# Patient Record
Sex: MALE | Race: OTHER | Employment: FULL TIME | URBAN - METROPOLITAN AREA
[De-identification: names, ages, dates, MRNs, and addresses within clinical notes are randomized per-mention and may not be internally consistent; named-entity substitution may affect disease eponyms.]

---

## 2017-06-02 ENCOUNTER — TELEPHONE (OUTPATIENT)
Dept: FAMILY MEDICINE CLINIC | Facility: CLINIC | Age: 27
End: 2017-06-02

## 2017-06-02 RX ORDER — METHYLPREDNISOLONE 4 MG/1
TABLET ORAL
Qty: 1 KIT | Refills: 0 | Status: SHIPPED | OUTPATIENT
Start: 2017-06-02 | End: 2017-09-25

## 2017-06-02 NOTE — TELEPHONE ENCOUNTER
I spoke to patient regarding his symptoms and he said that is not swollen is just watery and irritated. Per Dr Gonzáles Shoulders have the patient start a Medro Dose Marlon and use OTC Xyzal or Zyrtec. Rx sent to his [facility].

## 2017-09-25 ENCOUNTER — OFFICE VISIT (OUTPATIENT)
Dept: FAMILY MEDICINE CLINIC | Facility: CLINIC | Age: 27
End: 2017-09-25

## 2017-09-25 VITALS
SYSTOLIC BLOOD PRESSURE: 112 MMHG | BODY MASS INDEX: 28.17 KG/M2 | DIASTOLIC BLOOD PRESSURE: 70 MMHG | WEIGHT: 188 LBS | HEART RATE: 66 BPM | OXYGEN SATURATION: 100 % | HEIGHT: 68.5 IN

## 2017-09-25 DIAGNOSIS — S83.61XA: Primary | ICD-10-CM

## 2017-09-25 PROCEDURE — 99212 OFFICE O/P EST SF 10 MIN: CPT | Performed by: FAMILY MEDICINE

## 2017-09-25 RX ORDER — METHYLPREDNISOLONE 4 MG/1
TABLET ORAL
Qty: 1 KIT | Refills: 1 | Status: SHIPPED | OUTPATIENT
Start: 2017-09-25 | End: 2020-01-20

## 2017-09-25 NOTE — PROGRESS NOTES
[facility] Office Note  Chief Complaint:   Patient presents with:  Knee Pain      HPI:   This is a 32year old male coming in for    No results found for this or any previous visit.     Past Medical History:   Diagnosis and rhythm, no murmurs, rubs or gallops. LUNGS: Clear to auscultation bilterally, no rales/rhonchi/wheezing. CHEST: No tenderness. ABDOMEN:  Soft, nondistended, nontender, bowel sounds normal in all 4 quadrants, no masses, no hepatosplenomegaly.   BACK:

## 2018-04-26 ENCOUNTER — HOSPITAL ENCOUNTER (EMERGENCY)
Facility: HOSPITAL | Age: 28
Discharge: HOME | End: 2018-04-27
Attending: EMERGENCY MEDICINE
Payer: COMMERCIAL

## 2018-04-26 DIAGNOSIS — K21.9 GASTROESOPHAGEAL REFLUX DISEASE, ESOPHAGITIS PRESENCE NOT SPECIFIED: Primary | ICD-10-CM

## 2018-04-26 PROCEDURE — 96361 HYDRATE IV INFUSION ADD-ON: CPT

## 2018-04-26 PROCEDURE — 96374 THER/PROPH/DIAG INJ IV PUSH: CPT

## 2018-04-26 PROCEDURE — 99284 EMERGENCY DEPT VISIT MOD MDM: CPT

## 2018-04-26 PROCEDURE — 80048 BASIC METABOLIC PNL TOTAL CA: CPT | Performed by: EMERGENCY MEDICINE

## 2018-04-26 PROCEDURE — 85025 COMPLETE CBC W/AUTO DIFF WBC: CPT | Performed by: EMERGENCY MEDICINE

## 2018-04-26 PROCEDURE — 83690 ASSAY OF LIPASE: CPT | Performed by: EMERGENCY MEDICINE

## 2018-04-26 PROCEDURE — 80076 HEPATIC FUNCTION PANEL: CPT | Performed by: EMERGENCY MEDICINE

## 2018-04-26 RX ORDER — MAGNESIUM HYDROXIDE/ALUMINUM HYDROXICE/SIMETHICONE 120; 1200; 1200 MG/30ML; MG/30ML; MG/30ML
30 SUSPENSION ORAL ONCE
Status: COMPLETED | OUTPATIENT
Start: 2018-04-26 | End: 2018-04-26

## 2018-04-26 RX ORDER — ONDANSETRON 2 MG/ML
4 INJECTION INTRAMUSCULAR; INTRAVENOUS ONCE
Status: COMPLETED | OUTPATIENT
Start: 2018-04-26 | End: 2018-04-26

## 2018-04-26 RX ORDER — DICYCLOMINE HYDROCHLORIDE 10 MG/5ML
20 SOLUTION ORAL ONCE
Status: COMPLETED | OUTPATIENT
Start: 2018-04-26 | End: 2018-04-26

## 2018-04-27 VITALS
RESPIRATION RATE: 17 BRPM | OXYGEN SATURATION: 99 % | DIASTOLIC BLOOD PRESSURE: 66 MMHG | WEIGHT: 170 LBS | TEMPERATURE: 98 F | HEIGHT: 67 IN | SYSTOLIC BLOOD PRESSURE: 105 MMHG | HEART RATE: 70 BPM | BODY MASS INDEX: 26.68 KG/M2

## 2018-04-27 RX ORDER — PANTOPRAZOLE SODIUM 40 MG/1
40 TABLET, DELAYED RELEASE ORAL DAILY
Qty: 14 TABLET | Refills: 0 | Status: SHIPPED | OUTPATIENT
Start: 2018-04-27 | End: 2018-05-11

## 2018-04-27 NOTE — ED PROVIDER NOTES
Patient Seen in: [facility] AND [facility] Emergency Department    History   Patient presents with:  Abdomen/Flank Pain (GI/)    Stated Complaint: Abdominal pain, nausea/vomiting    HPI  25-year-old male presents emergency department with heartburn.   He has be 26.63 kg/m²         Physical Exam   Constitutional: He is oriented to person, place, and time. He appears well-developed and well-nourished. No distress. HENT:   Head: Normocephalic and atraumatic.    Mouth/Throat: Oropharynx is clear and moist.   Eyes: C ------                     CBC W/ DIFFERENTIAL[[id number]]          Abnormal            Final result                 Please view results for these tests on the individual orders.    HEPATIC FUNCTION PANEL (7)   [facility]

## 2020-01-21 PROBLEM — R10.13 DYSPEPSIA: Status: ACTIVE | Noted: 2020-01-21

## 2020-04-06 ENCOUNTER — HOSPITAL ENCOUNTER (EMERGENCY)
Facility: HOSPITAL | Age: 30
Discharge: HOME | End: 2020-04-06
Attending: EMERGENCY MEDICINE
Payer: COMMERCIAL

## 2020-04-06 ENCOUNTER — APPOINTMENT (OUTPATIENT)
Dept: GENERAL RADIOLOGY | Facility: HOSPITAL | Age: 30
End: 2020-04-06
Attending: EMERGENCY MEDICINE
Payer: COMMERCIAL

## 2020-04-06 VITALS
RESPIRATION RATE: 18 BRPM | DIASTOLIC BLOOD PRESSURE: 75 MMHG | HEART RATE: 74 BPM | OXYGEN SATURATION: 98 % | HEIGHT: 69 IN | WEIGHT: 190 LBS | BODY MASS INDEX: 28.14 KG/M2 | TEMPERATURE: 99 F | SYSTOLIC BLOOD PRESSURE: 131 MMHG

## 2020-04-06 DIAGNOSIS — J98.8 VIRAL RESPIRATORY ILLNESS: Primary | ICD-10-CM

## 2020-04-06 DIAGNOSIS — B97.89 VIRAL RESPIRATORY ILLNESS: Primary | ICD-10-CM

## 2020-04-06 PROCEDURE — 99283 EMERGENCY DEPT VISIT LOW MDM: CPT

## 2020-04-06 PROCEDURE — 71045 X-RAY EXAM CHEST 1 VIEW: CPT | Performed by: EMERGENCY MEDICINE

## 2020-04-06 NOTE — ED PROVIDER NOTES
Patient Seen in: [facility] Emergency Department      History   Patient presents with:  Cough/URI    Stated Complaint: Cough     HPI    18-year-old male was healthy living at home with family still working who 3 days ago started having some mild s distress. Head: Normocephalic and atraumatic. Eyes: Conjunctivae are normal. Pupils are equal, round, and reactive to light. Neck: Normal range of motion. Neck supple. No lymphadenopathy. No stridor.   Cardiovascular: Normal rate, regular rhythm and respiratory illness  (primary encounter diagnosis)    Disposition:  Discharge  4/6/2020  1:33 pm    Follow-up:  Zoe Guy MD  [address] [phone]    Call  As needed        Medications Prescribed:  Current Disc

## 2020-04-06 NOTE — ED INITIAL ASSESSMENT (HPI)
Sore throat, cough, runny eyes, runny nose. No fevers. 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

## 2020-10-21 ENCOUNTER — APPOINTMENT (OUTPATIENT)
Dept: LAB | Facility: HOSPITAL | Age: 30
End: 2020-10-21
Attending: FAMILY MEDICINE
Payer: COMMERCIAL

## 2020-10-21 DIAGNOSIS — R05.9 COUGH: ICD-10-CM

## 2021-10-12 ENCOUNTER — HOSPITAL ENCOUNTER (EMERGENCY)
Facility: HOSPITAL | Age: 31
Discharge: HOME | End: 2021-10-12
Payer: COMMERCIAL

## 2021-10-12 ENCOUNTER — APPOINTMENT (OUTPATIENT)
Dept: GENERAL RADIOLOGY | Facility: HOSPITAL | Age: 31
End: 2021-10-12
Attending: NURSE PRACTITIONER
Payer: COMMERCIAL

## 2021-10-12 VITALS
HEART RATE: 93 BPM | HEIGHT: 66 IN | SYSTOLIC BLOOD PRESSURE: 127 MMHG | BODY MASS INDEX: 31.82 KG/M2 | DIASTOLIC BLOOD PRESSURE: 77 MMHG | RESPIRATION RATE: 18 BRPM | WEIGHT: 198 LBS | TEMPERATURE: 99 F | OXYGEN SATURATION: 95 %

## 2021-10-12 DIAGNOSIS — B34.2 CORONAVIRUS INFECTION: Primary | ICD-10-CM

## 2021-10-12 PROCEDURE — 71045 X-RAY EXAM CHEST 1 VIEW: CPT | Performed by: NURSE PRACTITIONER

## 2021-10-12 PROCEDURE — 99453 REM MNTR PHYSIOL PARAM SETUP: CPT

## 2021-10-12 PROCEDURE — 99284 EMERGENCY DEPT VISIT MOD MDM: CPT

## 2021-10-12 RX ORDER — ALBUTEROL SULFATE 90 UG/1
2 AEROSOL, METERED RESPIRATORY (INHALATION) EVERY 4 HOURS PRN
Qty: 1 EACH | Refills: 0 | Status: SHIPPED | OUTPATIENT
Start: 2021-10-12 | End: 2021-11-11

## 2021-10-12 RX ORDER — IBUPROFEN 600 MG/1
600 TABLET ORAL ONCE
Status: COMPLETED | OUTPATIENT
Start: 2021-10-12 | End: 2021-10-12

## 2021-10-12 NOTE — ED PROVIDER NOTES
Patient Seen in: [facility] AND [facility] Emergency Department    History   Patient presents with:  Covid    Stated Complaint: Covid+ sob    HPI    Patient here with cough, shortness of breath, body aches for 9 days.   No travel, unknown contact with known crono °F (39.4 °C)   Temp src Oral   SpO2 94 %   O2 Device None (Room air)       Current:/77   Pulse 93   Temp 99.2 °F (37.3 °C) (Oral)   Resp 18   Ht 167.6 cm (5' 6\")   Wt 89.8 kg   SpO2 95%   BMI 31.96 kg/m²   PULSE OX 95% on room air  GENERAL: Sickly a social distance, avoid sharing personal items, clean and disinfect “high touch” surfaces, and frequent handwashing) according to CDC guidelines.                  Disposition and Plan     Clinical Impression:  Coronavirus infection  (primary encounter diagno

## 2025-02-11 ENCOUNTER — OFFICE VISIT (OUTPATIENT)
Dept: OTOLARYNGOLOGY | Facility: CLINIC | Age: 35
End: 2025-02-11

## 2025-02-11 DIAGNOSIS — R09.82 POSTNASAL DISCHARGE: ICD-10-CM

## 2025-02-11 DIAGNOSIS — J35.1 TONSILLAR HYPERTROPHY: ICD-10-CM

## 2025-02-11 DIAGNOSIS — R10.13 DYSPEPSIA: Primary | ICD-10-CM

## 2025-02-11 PROCEDURE — 99203 OFFICE O/P NEW LOW 30 MIN: CPT | Performed by: OTOLARYNGOLOGY

## 2025-02-11 RX ORDER — PANTOPRAZOLE SODIUM 40 MG/1
40 TABLET, DELAYED RELEASE ORAL
Qty: 60 TABLET | Refills: 3 | Status: SHIPPED | OUTPATIENT
Start: 2025-02-11

## 2025-02-11 RX ORDER — MONTELUKAST SODIUM 10 MG/1
10 TABLET ORAL NIGHTLY
Qty: 30 TABLET | Refills: 3 | Status: SHIPPED | OUTPATIENT
Start: 2025-02-11

## 2025-02-11 RX ORDER — AZELASTINE 1 MG/ML
2 SPRAY, METERED NASAL 2 TIMES DAILY
Qty: 30 ML | Refills: 3 | Status: SHIPPED | OUTPATIENT
Start: 2025-02-11

## 2025-02-11 NOTE — PROGRESS NOTES
Braden Garzon is a 34 year old male.    Chief Complaint   Patient presents with    Tonsil Problem     Patient is here due to enlarged tonsils       HISTORY OF PRESENT ILLNESS  He presents with 2-year history of chronic throat issues.  Complains of dysphonia throat clearing cough and discomfort of the throat at times.  No acute streptococcal infections.  Treated with some type of medication 2 years ago with no real improvement in his symptoms seen by his primary care physician and referred to me by Dr. Madsen for his complaints of difficulty breathing and loss of voice.  Noted to have kissing tonsils at that time.  He is currently on omeprazole for reflux and states that despite taking that every morning he has significant reflux symptoms throughout the day and night especially if he eats certain types of food.  Denies obstructive sleep apnea states that he does not even snore.  Also denies any recurrent tonsillar infections in the past.      Social History     Socioeconomic History    Marital status:    Tobacco Use    Smoking status: Never    Smokeless tobacco: Never   Vaping Use    Vaping status: Never Used   Substance and Sexual Activity    Alcohol use: Yes     Alcohol/week: 0.0 standard drinks of alcohol    Drug use: No       History reviewed. No pertinent family history.    Past Medical History:    Appendicitis    Per [employer] \"Other appendicitis\"    Dermatitis    Epistaxis    Fatigue    Healthy adult on routine physical examination    Otalgia, unspecified ear    Resolved    Personal history of non-drug allergy    Per [employer] \"Pers hx-oth allerg not medicinl agt\"       Past Surgical History:   Procedure Laterality Date    Appendectomy      Circumcision,othr           REVIEW OF SYSTEMS    System Neg/Pos Details   Constitutional Negative Fatigue, fever and weight loss.   ENMT Negative Drooling.   Eyes Negative Blurred vision and vision changes.   Respiratory Negative Dyspnea and wheezing.    Cardio Negative Chest pain, irregular heartbeat/palpitations and syncope.   GI Negative Abdominal pain and diarrhea.   Endocrine Negative Cold intolerance and heat intolerance.   Neuro Negative Tremors.   Psych Negative Anxiety and depression.   Integumentary Negative Frequent skin infections, pigment change and rash.   Hema/Lymph Negative Easy bleeding and easy bruising.           PHYSICAL EXAM    There were no vitals taken for this visit.       Constitutional Normal Overall appearance - Normal.   Psychiatric Normal Orientation - Oriented to time, place, person & situation. Appropriate mood and affect.   Neck Exam Normal Inspection - Normal. Palpation - Normal. Parotid gland - Normal. Thyroid gland - Normal.   Eyes Normal Conjunctiva - Right: Normal, Left: Normal. Pupil - Right: Normal, Left: Normal. Fundus - Right: Normal, Left: Normal.   Neurological Normal Memory - Normal. Cranial nerves - Cranial nerves II through XII grossly intact.   Head/Face Normal Facial features - Normal. Eyebrows - Normal. Skull - Normal.        Nasopharynx Normal External nose - Normal. Lips/teeth/gums - Normal. Tonsils - Normal. Oropharynx - Normal.   Ears Normal Inspection - Right: Normal, Left: Normal. Canal - Right: Normal, Left: Normal. TM - Right: Normal, Left: Normal.   Skin Normal Inspection - Normal.        Lymph Detail Normal Submental. Submandibular. Anterior cervical. Posterior cervical. Supraclavicular.        Nose/Mouth/Throat Normal External nose - Normal. Lips/teeth/gums - Normal. Tonsils -3+ oropharynx -postnasal discharge with erythema   Nose/Mouth/Throat Normal Nares - Right: Normal Left: Normal. Septum -Normal  Turbinates - Right: Normal, Left: Normal.  Nasal mucosa congested       Current Outpatient Medications:     pantoprazole 40 MG Oral Tab EC, Take 1 tablet (40 mg total) by mouth 2 (two) times daily before meals., Disp: 60 tablet, Rfl: 3    montelukast 10 MG Oral Tab, Take 1 tablet (10 mg total) by mouth  nightly., Disp: 30 tablet, Rfl: 3    loratadine-pseudoephedrine ER 5-120 MG Oral Tablet 12 Hr, Take 1 tablet by mouth every 12 (twelve) hours., Disp: 60 tablet, Rfl: 3    azelastine 0.1 % Nasal Solution, 2 sprays by Nasal route 2 (two) times daily., Disp: 30 mL, Rfl: 3    Omeprazole 40 MG Oral Capsule Delayed Release, Take 1 capsule (40 mg total) by mouth daily., Disp: 90 capsule, Rfl: 3  ASSESSMENT AND PLAN    1. Dyspepsia    2. Postnasal discharge  - loratadine-pseudoephedrine ER 5-120 MG Oral Tablet 12 Hr; Take 1 tablet by mouth every 12 (twelve) hours.  Dispense: 60 tablet; Refill: 3    3. Tonsillar hypertrophy  He is to have breakthrough reflux symptoms despite being on omeprazole daily.  He has significant postnasal discharge and 3+ tonsils.  Specifically denies any history of recurrent streptococcal infection and also specifically denies any history of snoring or obstructive sleep apnea.  At this time I did ask him to changing from omeprazole to pantoprazole twice daily, Singulair Loratadine-D and Astelin for some nasal symptoms and postnasal discharge.  Return to see me in 1 month to see if he notes any improvement in his throat symptoms specifically dysphonia throat pain throat clearing and cough.  I do suspect that he may have some level obstructive sleep apnea but he currently denies therefore we will discuss further when he returns.  At this time no indication for tonsillectomy based on his lack of symptoms        This note was prepared using [employer] voice recognition dictation software. As a result errors may occur. When identified these errors have been corrected. While every attempt is made to correct errors during dictation discrepancies may still exist    Ciro Cody MD    2/11/2025    3:31 PM

## (undated) NOTE — LETTER
Brian Madsen Md  [address]  [place] [address]       02/11/25        Patient: Braden Garzon   YOB: 1990   Date of Visit: 2/11/2025       Dear  Dr. Tena MD,      Thank you for referring Braden Garzon to my practice.  Please find my assessment and plan below.    ASSESSMENT AND PLAN    1. Dyspepsia    2. Postnasal discharge  - loratadine-pseudoephedrine ER 5-120 MG Oral Tablet 12 Hr; Take 1 tablet by mouth every 12 (twelve) hours.  Dispense: 60 tablet; Refill: 3    3. Tonsillar hypertrophy  He is to have breakthrough reflux symptoms despite being on omeprazole daily.  He has significant postnasal discharge and 3+ tonsils.  Specifically denies any history of recurrent streptococcal infection and also specifically denies any history of snoring or obstructive sleep apnea.  At this time I did ask him to changing from omeprazole to pantoprazole twice daily, Singulair Loratadine-D and Astelin for some nasal symptoms and postnasal discharge.  Return to see me in 1 month to see if he notes any improvement in his throat symptoms specifically dysphonia throat pain throat clearing and cough.  I do suspect that he may have some level obstructive sleep apnea but he currently denies therefore we will discuss further when he returns.  At this time no indication for tonsillectomy based on his lack of symptoms                 Sincerely,   Ciro Cody MD   [facility], [facility], [place]  [address]  [facility] [id number]    Document electronically generated by:  Ciro Cody MD

## (undated) NOTE — LETTER
[phone] [id number]  [phone]          Patient: Lincoln Tran   YOB: 1990   Date of Visit: 4/6/2020       Dear Employer,         April 6, 2020    At [facility], it is at all feasible for them to do so. COVID-19 is especially risky for high-risk patients (including, but not limited to, age over 61, immunosuppressed status due to disease or medication, chronic respiratory or heart conditions and diabetes).     · Soy Cabral

## (undated) NOTE — ED AVS SNAPSHOT
Kevin Lester   MRN: [id number]    Department:  [facility] Emergency Department   Date of Visit:  4/26/2018           Disclosure     Insurance plans vary and the physician(s) referred by the ER may not be covered by your plan.  Please contact CARE PHYSICIAN AT ONCE OR RETURN IMMEDIATELY TO THE EMERGENCY DEPARTMENT. If you have been prescribed any medication(s), please fill your prescription right away and begin taking the medication(s) as directed.   If you believe that any of the medications